# Patient Record
Sex: FEMALE | Race: WHITE | NOT HISPANIC OR LATINO | Employment: FULL TIME | URBAN - METROPOLITAN AREA
[De-identification: names, ages, dates, MRNs, and addresses within clinical notes are randomized per-mention and may not be internally consistent; named-entity substitution may affect disease eponyms.]

---

## 2017-01-20 ENCOUNTER — OFFICE VISIT (OUTPATIENT)
Dept: MEDICAL GROUP | Facility: MEDICAL CENTER | Age: 57
End: 2017-01-20
Payer: COMMERCIAL

## 2017-01-20 VITALS
HEART RATE: 74 BPM | SYSTOLIC BLOOD PRESSURE: 126 MMHG | OXYGEN SATURATION: 92 % | DIASTOLIC BLOOD PRESSURE: 74 MMHG | HEIGHT: 62 IN | WEIGHT: 257 LBS | BODY MASS INDEX: 47.29 KG/M2 | TEMPERATURE: 97.4 F | RESPIRATION RATE: 14 BRPM

## 2017-01-20 DIAGNOSIS — J02.9 PHARYNGITIS, UNSPECIFIED ETIOLOGY: ICD-10-CM

## 2017-01-20 PROCEDURE — 99213 OFFICE O/P EST LOW 20 MIN: CPT | Performed by: FAMILY MEDICINE

## 2017-01-20 RX ORDER — CLINDAMYCIN HYDROCHLORIDE 300 MG/1
300 CAPSULE ORAL 3 TIMES DAILY
Qty: 15 CAP | Refills: 0 | Status: SHIPPED | OUTPATIENT
Start: 2017-01-20 | End: 2017-01-25

## 2017-01-20 NOTE — MR AVS SNAPSHOT
"        Namrata Castellanos   2017 2:20 PM   Office Visit   MRN: [id number]    Department:  [address] [place] [address]   Dept Phone:  [phone]    Description:  Female : 1960   Provider:  Regina Navarro M.D.           Reason for Visit     Pharyngitis x 1 week    URI x 1 week      Allergies as of 2017     Allergen Noted Reactions    Ceclor [Cefaclor] 2014   Hives, Swelling    Ciprofloxacin 2014       Purpura    Erythromycin 2014   Hives, Swelling    Flagyl [Kdc:Metronidazole+Tartrazine] 2014   Hives, Swelling    Keflex 2015   Hives, Shortness of Breath    Macrodantin [Kdc:Red Dye+Yellow Dye+Ci Pigment Blue 63+Nitrofurantoin] 2014   Hives, Swelling    Tongue swelling    Pcn [Penicillins] 2014   Hives, Swelling    Septra [Bactrim Ds] 2014   Swelling    Tetracycline 2014   Hives, Swelling    Aspirin 2014   Vomiting    Nsaids 2014   Vomiting    GI bleeding    Tape 2014   Atopic Dermatitis    Corn Dextrin [Cvs Easy Fiber] 2014       Fish 2014       GI upset/bleeding    Other Food 2016   Diarrhea    Gluten intolerance:  Lower GI bleeding, pain, swelling    Wheat Bran 2014         Vital Signs     Blood Pressure Pulse Temperature Respirations Height Weight    126/74 mmHg 74 36.3 °C (97.4 °F) 14 1.575 m (5' 2.01\") 116.574 kg (257 lb)    Body Mass Index Oxygen Saturation Smoking Status             46.99 kg/m2 92% Never Smoker          Basic Information     Date Of Birth Sex Race Ethnicity Preferred Language    1960 Female White Non- English      Your appointments     Mar 14, 2017  3:00 PM   Follow Up Visit with Chaparrita Moss M.D.   [facility] (--)    [address], [address]  [facility] [address] [phone]           You will be receiving a confirmation call a few days before your appointment from our automated call confirmation system.              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   9/27/2016 - Present    BMI 40.0-44.9, adult (CMS-HCC) Z68.41   9/27/2016 - Present    Uncomplicated severe persistent asthma J45.50   9/27/2016 - Present      Health Maintenance        Date Due Completion Dates    IMM DTaP/Tdap/Td Vaccine (1 - Tdap) 7/27/1978 ---    IMM PNEUMOCOCCAL 19-64 (ADULT) MEDIUM RISK SERIES (1 of 1 - PPSV23) 7/27/1978 ---    COLONOSCOPY 7/27/2009 ---    MAMMOGRAM 12/1/2015 12/1/2014, 11/24/2014, 10/4/2012 (Prv Comp)    Override on 10/4/2012: Previously completed    IMM INFLUENZA (1) 9/1/2016 10/5/2015, 10/10/2014            Current Immunizations     Influenza Vaccine Quad Inj (Pf) 10/5/2015  9:13 AM, 10/10/2014    Tuberculin Skin Test 8/7/2014  8:42 AM      Below and/or attached are the medications your provider expects you to take. Review all of your home medications and newly ordered medications with your provider and/or pharmacist. Follow medication instructions as directed by your provider and/or pharmacist. Please keep your medication list with you and share with your provider. Update the information when medications are discontinued, doses are changed, or new medications (including over-the-counter products) are added; and carry medication information at all times in the event of emergency situations     Allergies:  CECLOR - Hives,Swelling     CIPROFLOXACIN - (reactions not documented)     ERYTHROMYCIN - Hives,Swelling     FLAGYL - Hives,Swelling     KEFLEX - Hives,Shortness of Breath     MACRODANTIN - Hives,Swelling     PCN - Hives,Swelling     SEPTRA - Swelling     TETRACYCLINE - Hives,Swelling     ASPIRIN - Vomiting     NSAIDS - Vomiting     TAPE - Atopic Dermatitis     CORN DEXTRIN - (reactions not documented)     FISH - (reactions not documented)      OTHER FOOD - Diarrhea     WHEAT BRAN - (reactions not documented)               Medications  Valid as of: January 20, 2017 -  2:41 PM    Generic Name Brand Name Tablet Size Instructions for use    Albuterol Sulfate (Aero Soln) albuterol 108 (90 BASE) MCG/ACT Inhale 2 Puffs by mouth every 6 hours as needed.        Clindamycin HCl (Cap) CLEOCIN 300 MG Take 1 Cap by mouth 3 times a day for 5 days.        Fluticasone Propionate (Suspension) FLONASE 50 MCG/ACT Spray 1 Spray in nose every day.        Fluticasone-Salmeterol (AEROSOL POWDER, BREATH ACTIVATED) ADVAIR 500-50 MCG/DOSE Inhale 1 Puff by mouth every 12 hours.        .                 Medicines prescribed today were sent to:     [facility] [address] [place], [place] - [address]    [address] [facility] [address]    Phone: [phone] Fax: [phone]    Open 24 Hours?: No    [id number] [place], [place] - [address]    [address] [facility] [address]    Phone: [phone] Fax: [phone]    Open 24 Hours?: No      Medication refill instructions:       If your prescription bottle indicates you have medication refills left, it is not necessary to call your provider’s office. Please contact your pharmacy and they will refill your medication.    If your prescription bottle indicates you do not have any refills left, you may request refills at any time through one of the following ways: The online Comeks system (except Urgent Care), by calling your provider’s office, or by asking your pharmacy to contact your provider’s office with a refill request. Medication refills are processed only during regular business hours and may not be available until the next business day. Your provider may request additional information or to have a follow-up visit with you prior to refilling your medication.   *Please Note: Medication refills are assigned a new Rx number when refilled electronically. Your pharmacy may  indicate that no refills were authorized even though a new prescription for the same medication is available at the pharmacy. Please request the medicine by name with the pharmacy before contacting your provider for a refill.           [employer] Mobile Appt Access Code: Activation code not generated  Current ImmunotEGG Status: Active

## 2017-01-21 NOTE — PROGRESS NOTES
cc:  Sore throat    Subjective:     Namrata Castellanos is a 56 y.o. female presenting with a worsening sore throat.  Started about 4-5 days ago.  Associated with fevers for 3 nights, nausea, diarrhea, decreased appetite, nasal congestion, headaches, cough, sneezing, some shortness of breath.  Has been taking elderberry which has been helpful, nasal saline rinse.  Denies any nausea/vomiting, chest pain, abdoinal pain, blood in urine/stools, rash, sick contacts, recent travel.  No flu shot this season.  Is traveling to [place] on Monday, is worried symptoms will continue to get worse.      Review of systems:  See above.          Current outpatient prescriptions:   •  clindamycin (CLEOCIN) 300 MG Cap, Take 1 Cap by mouth 3 times a day for 5 days., Disp: 15 Cap, Rfl: 0  •  albuterol (VENTOLIN OR PROVENTIL) 108 (90 BASE) MCG/ACT AERS inhalation aerosol, Inhale 2 Puffs by mouth every 6 hours as needed., Disp: 8.5 g, Rfl: 3  •  fluticasone (FLONASE) 50 MCG/ACT nasal spray, Spray 1 Spray in nose every day., Disp: , Rfl:   •  fluticasone-salmeterol (ADVAIR) 500-50 MCG/DOSE AEPB, Inhale 1 Puff by mouth every 12 hours., Disp: , Rfl:     Allergies   Allergen Reactions   • Ceclor [Cefaclor] Hives and Swelling   • Ciprofloxacin      Purpura   • Erythromycin Hives and Swelling   • Flagyl [Kdc:Metronidazole+Tartrazine] Hives and Swelling   • Keflex Hives and Shortness of Breath   • Macrodantin [Kdc:Red Dye+Yellow Dye+Ci Pigment Blue 63+Nitrofurantoin] Hives and Swelling     Tongue swelling   • Pcn [Penicillins] Hives and Swelling   • Septra [Bactrim Ds] Swelling   • Tetracycline Hives and Swelling   • Aspirin Vomiting   • Nsaids Vomiting     GI bleeding   • Tape Atopic Dermatitis   • Corn Dextrin [Cvs Easy Fiber]    • Fish      GI upset/bleeding   • Other Food Diarrhea     Gluten intolerance:  Lower GI bleeding, pain, swelling   • Wheat Bran        Past Medical History   Diagnosis Date   • Allergy, unspecified not elsewhere  classified    • Asthma    • H/o Lyme disease 1990     tertiary, treated with IV abx   • Arthritis    • Gluten intolerance    • Positive JOHNIE (antinuclear antibody)    • 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   • Hiatus hernia syndrome    • Bowel habit changes      constipation or diarrhea diet related   • Dental disorder      bilateral upper broken molar   • Pain      joints (arthritis); left hip, feet, ankles, hands     Past Surgical History   Procedure Laterality Date   • Rotator cuff repair Right 2012     Right   • Abdominal hysterectomy total  2005     BSO-cysts, heavy bleeding   • Lumpectomy Right 2001     papillary adenoma right breast   • Primary c section  1990   • Appendectomy  1998   • Knee arthroscopy Left 2015   • Shoulder arthroscopy Left 2015   • Laparoscopy  1994   • Ethmoidectomy Left 1993   • Antrostomy Bilateral 3/11/2016     Procedure: ENDOSCOPIC MAXILLARY ANTROSTOMIES;  Surgeon: Cat Carver M.D.;  Location: [facility];  Service:    • Ethmoidectomy Bilateral 3/11/2016     Procedure: ENDOSCOPIC ETHMOIDECTOMIES;  Surgeon: Cat Carver M.D.;  Location: [facility];  Service:    • Turbinoplasty Bilateral 3/11/2016     Procedure: TURBINOPLASTY;  Surgeon: Cat Carver M.D.;  Location: SURGERY [facility];  Service:      Family History   Problem Relation Age of Onset   • Other Mother      macular degeneration   • Cancer Mother      breast   • Cancer Father      colon   • GI Brother      celiac   • Other Brother      psoriasis   • Cancer Paternal Uncle      colon   • GI Paternal Grandmother      UC   • Diabetes     • Heart Disease     • Hypertension       Social History     Social History   • Marital Status:      Spouse Name: N/A   • Number of Children: N/A   • Years of Education: N/A     Occupational History   • Not on file.     Social History Main Topics   • Smoking status: Never Smoker    • Smokeless tobacco: Never Used   • Alcohol  "Use: 0.0 oz/week     0 Standard drinks or equivalent per week      Comment: 2 per week   • Drug Use: No   • Sexual Activity:     Partners: Male     Other Topics Concern   • Not on file     Social History Narrative       Objective:     Vitals: /74 mmHg  Pulse 74  Temp(Src) 36.3 °C (97.4 °F)  Resp 14  Ht 1.575 m (5' 2.01\")  Wt 116.574 kg (257 lb)  BMI 46.99 kg/m2  SpO2 92%  General: Alert, pleasant, NAD  HEENT: Normocephalic.  PERRL, EOMI, no icterus or pallor.  Conjunctivae and lids normal. External ears normal. Tympanic membranes pearly, opaque.  Nares patent, mucosa pink.  Posterior pharynx slightly erythematous, no exudates, mucous membranes moist.  Neck supple.  No thyromegaly or masses palpated. No cervical or supraclavicular lymphadenopathy.  Heart: Regular rate and rhythm.  S1 and S2 normal.  No murmurs appreciated.  Respiratory: Normal respiratory effort.  Clear to auscultation bilaterally.    Abdomen: Non-distended, soft  Skin: Warm, dry.  Erythematous patch on forehead, nose, cheeks, chin with scattered erythematous papules.  Extremities: No leg edema.   Psych:  Affect/mood is normal, judgement is good, memory is intact, grooming is appropriate.    Assessment/Plan:     Namrata was seen today for pharyngitis and uri.    Diagnoses and all orders for this visit:    Pharyngitis, unspecified etiology  Discussed likely viral URI, recommended conservative management with rest, fluids, otc meds, hot water and honey, nasal saline rinses.   As she will be traveling out of state in a couple days, prescription for clindamycin printed and given to patient in case symptoms worsen.  Pt agrees to not take it unless symptoms persist for another 6 days or so, she doesn't like to take medications unless needed since she has multiple antibiotic allergies and environmental allergies as well.  She even appears to have an allergic rash on her face, pt plans to start taking zyrtec for the itch.  -     clindamycin " (CLEOCIN) 300 MG Cap; Take 1 Cap by mouth 3 times a day for 5 days.        Return if symptoms worsen or fail to improve.

## 2017-03-09 ENCOUNTER — OFFICE VISIT (OUTPATIENT)
Dept: MEDICAL GROUP | Facility: MEDICAL CENTER | Age: 57
End: 2017-03-09
Payer: COMMERCIAL

## 2017-03-09 VITALS
DIASTOLIC BLOOD PRESSURE: 82 MMHG | OXYGEN SATURATION: 96 % | HEIGHT: 62 IN | BODY MASS INDEX: 48.58 KG/M2 | WEIGHT: 264 LBS | RESPIRATION RATE: 16 BRPM | SYSTOLIC BLOOD PRESSURE: 124 MMHG | HEART RATE: 78 BPM | TEMPERATURE: 98.6 F

## 2017-03-09 DIAGNOSIS — J45.50 UNCOMPLICATED SEVERE PERSISTENT ASTHMA: ICD-10-CM

## 2017-03-09 DIAGNOSIS — Z23 NEED FOR VACCINATION: ICD-10-CM

## 2017-03-09 DIAGNOSIS — E66.01 MORBID OBESITY WITH BMI OF 45.0-49.9, ADULT (HCC): ICD-10-CM

## 2017-03-09 PROCEDURE — 90732 PPSV23 VACC 2 YRS+ SUBQ/IM: CPT | Performed by: FAMILY MEDICINE

## 2017-03-09 PROCEDURE — 90471 IMMUNIZATION ADMIN: CPT | Performed by: FAMILY MEDICINE

## 2017-03-09 PROCEDURE — 99213 OFFICE O/P EST LOW 20 MIN: CPT | Mod: 25 | Performed by: FAMILY MEDICINE

## 2017-03-09 RX ORDER — MONTELUKAST SODIUM 10 MG/1
10 TABLET ORAL DAILY
Qty: 90 TAB | Refills: 0 | Status: SHIPPED | OUTPATIENT
Start: 2017-03-09 | End: 2017-07-13 | Stop reason: SDUPTHER

## 2017-03-09 RX ORDER — ALBUTEROL SULFATE 90 UG/1
2 AEROSOL, METERED RESPIRATORY (INHALATION) EVERY 6 HOURS PRN
Qty: 8.5 G | Refills: 3 | Status: SHIPPED | OUTPATIENT
Start: 2017-03-09

## 2017-03-10 NOTE — PROGRESS NOTES
cc:  asthma    Subjective:     Namrata Castellanos is a 56 y.o. female presenting for a follow up of asthma.  She ran out of her advair about a month ago, and has been feeling short of breath at times, relieved with albuterol, also has a dry intermittent cough.  Has been using albuterol 5 times a day.  Continues to use flonase.  Has allergies, tried zyrtec but it made her drowsy, claritin was not that effective, allegra hasn't been tried yet.  Does have some left sided chest pain when she takes a deep breath, but she thinks it is from the coughing.      Review of systems:  See above.          Current outpatient prescriptions:   •  fluticasone-salmeterol (ADVAIR) 500-50 MCG/DOSE AEROSOL POWDER, BREATH ACTIVATED, Inhale 1 Puff by mouth every 12 hours., Disp: 3 Inhaler, Rfl: 3  •  albuterol 108 (90 BASE) MCG/ACT Aero Soln inhalation aerosol, Inhale 2 Puffs by mouth every 6 hours as needed for Shortness of Breath., Disp: 8.5 g, Rfl: 3  •  montelukast (SINGULAIR) 10 MG Tab, Take 1 Tab by mouth every day., Disp: 90 Tab, Rfl: 0  •  fluticasone (FLONASE) 50 MCG/ACT nasal spray, Spray 1 Spray in nose every day., Disp: , Rfl:     Allergies   Allergen Reactions   • Ceclor [Cefaclor] Hives and Swelling   • Ciprofloxacin      Purpura   • Erythromycin Hives and Swelling   • Flagyl [Kdc:Metronidazole+Tartrazine] Hives and Swelling   • Keflex Hives and Shortness of Breath   • Pcn [Penicillins] Hives and Swelling   • Septra [Bactrim Ds] Swelling   • Tetracycline Hives and Swelling   • Aspirin Vomiting   • Nsaids Vomiting     GI bleeding   • Tape Atopic Dermatitis   • Corn Dextrin [Cvs Easy Fiber]    • Fish      GI upset/bleeding   • Nitrofurantoin Hives and Swelling     Tongue swelling   • Other Food Diarrhea     Gluten intolerance:  Lower GI bleeding, pain, swelling   • Wheat Bran        Past Medical History   Diagnosis Date   • Allergy, unspecified not elsewhere classified    • Asthma    • H/o Lyme disease 1990     tertiary,  treated with IV abx   • Arthritis    • Gluten intolerance    • Positive JOHNIE (antinuclear antibody)    • 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   • Hiatus hernia syndrome    • Bowel habit changes      constipation or diarrhea diet related   • Dental disorder      bilateral upper broken molar   • Pain      joints (arthritis); left hip, feet, ankles, hands     Past Surgical History   Procedure Laterality Date   • Rotator cuff repair Right 2012     Right   • Abdominal hysterectomy total  2005     BSO-cysts, heavy bleeding   • Lumpectomy Right 2001     papillary adenoma right breast   • Primary c section  1990   • Appendectomy  1998   • Knee arthroscopy Left 2015   • Shoulder arthroscopy Left 2015   • Laparoscopy  1994   • Ethmoidectomy Left 1993   • Antrostomy Bilateral 3/11/2016     Procedure: ENDOSCOPIC MAXILLARY ANTROSTOMIES;  Surgeon: Cat Carver M.D.;  Location: SUR[facility]ERY [facility];  Service:    • Ethmoidectomy Bilateral 3/11/2016     Procedure: ENDOSCOPIC ETHMOIDECTOMIES;  Surgeon: Cat Carver M.D.;  Location: [facility];  Service:    • Turbinoplasty Bilateral 3/11/2016     Procedure: TURBINOPLASTY;  Surgeon: Cat Carver M.D.;  Location: SURGERY [facility];  Service:      Family History   Problem Relation Age of Onset   • Other Mother      macular degeneration   • Cancer Mother      breast   • Cancer Father      colon   • GI Brother      celiac   • Other Brother      psoriasis   • Cancer Paternal Uncle      colon   • GI Paternal Grandmother      UC   • Diabetes     • Heart Disease     • Hypertension       Social History     Social History   • Marital Status:      Spouse Name: N/A   • Number of Children: N/A   • Years of Education: N/A     Occupational History   • Not on file.     Social History Main Topics   • Smoking status: Never Smoker    • Smokeless tobacco: Never Used   • Alcohol Use: 0.0 oz/week     0 Standard drinks or equivalent per week       "Comment: 2 per week   • Drug Use: No   • Sexual Activity:     Partners: Male     Other Topics Concern   • Not on file     Social History Narrative       Objective:     Vitals: /82 mmHg  Pulse 78  Temp(Src) 37 °C (98.6 °F)  Resp 16  Ht 1.575 m (5' 2.01\")  Wt 119.75 kg (264 lb)  BMI 48.27 kg/m2  SpO2 96%  General: Alert, pleasant, NAD  HEENT: Normocephalic.    Heart: Regular rate and rhythm.  S1 and S2 normal.  No murmurs appreciated.  Respiratory: Normal respiratory effort.  Clear to auscultation bilaterally.  Good air movement throughout. No wheezing  Abdomen: Non-distended, soft  Extremities: No leg edema.   Psych:  Affect/mood is normal, judgement is good, memory is intact, grooming is appropriate.    Assessment/Plan:     Namrata was seen today for asthma.    Diagnoses and all orders for this visit:    Uncomplicated severe persistent asthma  Will resume the advair daily, albuterol prn, flonase daily, also recommended trying allegra or claritin.  Will also see if singulair is covered if allegra and claritin are ineffective.  -     fluticasone-salmeterol (ADVAIR) 500-50 MCG/DOSE AEROSOL POWDER, BREATH ACTIVATED; Inhale 1 Puff by mouth every 12 hours.  -     albuterol 108 (90 BASE) MCG/ACT Aero Soln inhalation aerosol; Inhale 2 Puffs by mouth every 6 hours as needed for Shortness of Breath.  -     montelukast (SINGULAIR) 10 MG Tab; Take 1 Tab by mouth every day.    Need for vaccination  -     PNEUMOCOCCAL POLYSACCHARIDE VACCINE 23-VALENT =>1YO SQ/IM    Morbid obesity with BMI of 45.0-49.9, adult (CMS-[facility])  -     Patient identified as having weight management issue.  Appropriate orders and counseling given.      Return in about 6 months (around 9/9/2017).  "

## 2017-03-14 ENCOUNTER — OFFICE VISIT (OUTPATIENT)
Dept: RHEUMATOLOGY | Facility: PHYSICIAN GROUP | Age: 57
End: 2017-03-14
Payer: COMMERCIAL

## 2017-03-14 VITALS
DIASTOLIC BLOOD PRESSURE: 70 MMHG | RESPIRATION RATE: 16 BRPM | TEMPERATURE: 99.1 F | HEART RATE: 92 BPM | BODY MASS INDEX: 47.91 KG/M2 | OXYGEN SATURATION: 95 % | WEIGHT: 262 LBS | SYSTOLIC BLOOD PRESSURE: 122 MMHG

## 2017-03-14 DIAGNOSIS — K90.0 CD (CELIAC DISEASE): ICD-10-CM

## 2017-03-14 DIAGNOSIS — M15.9 PRIMARY OSTEOARTHRITIS INVOLVING MULTIPLE JOINTS: ICD-10-CM

## 2017-03-14 DIAGNOSIS — E66.01 MORBID OBESITY WITH BMI OF 45.0-49.9, ADULT (HCC): ICD-10-CM

## 2017-03-14 DIAGNOSIS — R76.0 LUPUS ANTICOAGULANT POSITIVE: ICD-10-CM

## 2017-03-14 PROCEDURE — 99214 OFFICE O/P EST MOD 30 MIN: CPT | Performed by: INTERNAL MEDICINE

## 2017-03-14 RX ORDER — MELOXICAM 15 MG/1
TABLET ORAL
Qty: 30 TAB | Refills: 2 | Status: SHIPPED | OUTPATIENT
Start: 2017-03-14

## 2017-03-14 NOTE — MR AVS SNAPSHOT
Namrata Castellanos   3/14/2017 3:00 PM   Office Visit   MRN: [id number]    Department:  Rheumatology [facility]   Dept Phone:  [phone]    Description:  Female : 1960   Provider:  Chaparrita Moss M.D.           Reason for Visit     Follow-Up           Allergies as of 3/14/2017     Allergen Noted Reactions    Ceclor [Cefaclor] 2014   Hives, Swelling    Ciprofloxacin 2014       Purpura    Erythromycin 2014   Hives, Swelling    Flagyl [Kdc:Metronidazole+Tartrazine] 2014   Hives, Swelling    Keflex 2015   Hives, Shortness of Breath    Pcn [Penicillins] 2014   Hives, Swelling    Septra [Bactrim Ds] 2014   Swelling    Tetracycline 2014   Hives, Swelling    Aspirin 2014   Vomiting    Nsaids 2014   Vomiting    GI bleeding    Tape 2014   Atopic Dermatitis    Corn Dextrin [Cvs Easy Fiber] 2014       Fish 2014       GI upset/bleeding    Nitrofurantoin 2017   Hives, Swelling    Tongue swelling    Other Food 2016   Diarrhea    Gluten intolerance:  Lower GI bleeding, pain, swelling    Wheat Bran 2014         You were diagnosed with     Primary osteoarthritis involving multiple joints   [1428408]       CD (celiac disease)   [436192]         Vital Signs     Blood Pressure Pulse Temperature Respirations Weight Oxygen Saturation    122/70 mmHg 92 37.3 °C (99.1 °F) 16 118.842 kg (262 lb) 95%    Smoking Status                   Never Smoker            Basic Information     Date Of Birth Sex Race Ethnicity Preferred Language    1960 Female White Non- English      Your appointments     2017  2:00 PM   Follow Up Visit with Chaparrita Moss M.D.   [facility] (--)    [address], [address]  [place] [address] [phone]           You will be receiving a confirmation call a few days before your appointment from our automated call confirmation system.              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   9/27/2016 - Present    Uncomplicated severe persistent asthma J45.50   9/27/2016 - Present    Morbid obesity with BMI of 45.0-49.9, adult (HCC) E66.01, Z68.42   3/9/2017 - Present    Primary osteoarthritis involving multiple joints M15.0   3/14/2017 - Present      Health Maintenance        Date Due Completion Dates    IMM DTaP/Tdap/Td Vaccine (1 - Tdap) 7/27/1978 ---    COLONOSCOPY 7/27/2009 ---    MAMMOGRAM 12/1/2015 12/1/2014, 11/24/2014, 10/4/2012 (Prv Comp)    Override on 10/4/2012: Previously completed    IMM INFLUENZA (1) 9/1/2016 10/5/2015, 10/10/2014            Current Immunizations     Influenza Vaccine Quad Inj (Pf) 10/5/2015  9:13 AM, 10/10/2014    Pneumococcal polysaccharide vaccine (PPSV-23) 3/9/2017    Tuberculin Skin Test 8/7/2014  8:42 AM      Below and/or attached are the medications your provider expects you to take. Review all of your home medications and newly ordered medications with your provider and/or pharmacist. Follow medication instructions as directed by your provider and/or pharmacist. Please keep your medication list with you and share with your provider. Update the information when medications are discontinued, doses are changed, or new medications (including over-the-counter products) are added; and carry medication information at all times in the event of emergency situations     Allergies:  CECLOR - Hives,Swelling     CIPROFLOXACIN - (reactions not documented)     ERYTHROMYCIN - Hives,Swelling     FLAGYL - Hives,Swelling     KEFLEX - Hives,Shortness of Breath     PCN - Hives,Swelling     SEPTRA - Swelling     TETRACYCLINE - Hives,Swelling     ASPIRIN - Vomiting     NSAIDS - Vomiting     TAPE - Atopic Dermatitis     CORN DEXTRIN - (reactions not documented)     FISH - (reactions not documented)      NITROFURANTOIN - Hives,Swelling     OTHER FOOD - Diarrhea     WHEAT BRAN - (reactions not documented)               Medications  Valid as of: March 14, 2017 -  3:44 PM    Generic Name Brand Name Tablet Size Instructions for use    Albuterol Sulfate (Aero Soln) albuterol 108 (90 BASE) MCG/ACT Inhale 2 Puffs by mouth every 6 hours as needed for Shortness of Breath.        Fluticasone Propionate (Suspension) FLONASE 50 MCG/ACT Spray 1 Spray in nose every day.        Fluticasone-Salmeterol (AEROSOL POWDER, BREATH ACTIVATED) ADVAIR 500-50 MCG/DOSE Inhale 1 Puff by mouth every 12 hours.        Meloxicam (Tab) MOBIC 15 MG 1 tab po qday for joint pain        Montelukast Sodium (Tab) SINGULAIR 10 MG Take 1 Tab by mouth every day.        .                 Medicines prescribed today were sent to:     [facility] [address] [place] [address], [place] - [address] [facility] [address]    [address] [facility] [address]    Phone: [phone] Fax: [phone]    Open 24 Hours?: No    [id number] [place], [place] - [address] [facility] [address]    [address] [facility] [address] [place] [address]    Phone: [phone] Fax: [phone]    Open 24 Hours?: No      Medication refill instructions:       If your prescription bottle indicates you have medication refills left, it is not necessary to call your provider’s office. Please contact your pharmacy and they will refill your medication.    If your prescription bottle indicates you do not have any refills left, you may request refills at any time through one of the following ways: The online WalkMe system (except Urgent Care), by calling your provider’s office, or by asking your pharmacy to contact your provider’s office with a refill request. Medication refills are processed only during regular business hours and may not be available until the next business day. Your provider may request additional information or to have a follow-up visit with you prior to refilling your medication.      *Please Note: Medication refills are assigned a new Rx number when refilled electronically. Your pharmacy may indicate that no refills were authorized even though a new prescription for the same medication is available at the pharmacy. Please request the medicine by name with the pharmacy before contacting your provider for a refill.           [employer] Access Code: Activation code not generated  Current [employer] Status: Active

## 2017-03-14 NOTE — Clinical Note
[facility]   [address], [address]  [place] [address]  Phone: [phone]  Fax: [phone]              Encounter Date: 3/14/2017    Dear Dr. Valencia ref. provider found,    It was a pleasure seeing your patient, Namrata Castellanos, on 3/14/2017. Diagnoses of Primary osteoarthritis involving multiple joints, Lupus anticoagulant positive, CD (celiac disease), and Morbid obesity with BMI of 45.0-49.9, adult (HCC) were pertinent to this visit.     Please find attached progress note which includes the history I obtained from Ms. Castellanos, my physical examination findings, my impression and recommendations.      Once again, it was a pleasure participating in your patient's care.  Please feel free to contact me if you have any questions or if I can be of any further assistance to your patients.      Sincerely,    Chaparrita Moss M.D.  Electronically Signed          PROGRESS NOTE:  No notes on file

## 2017-03-15 NOTE — PROGRESS NOTES
Chief Complaint- joint pain    Subjective:   Namrata Castellanos is a 56 y.o. female here today for follow up of rheumatological issues    This is a follow-up patient to this clinic, new patient to me, previously followed by Dr Godinez for polyarthralgias, patient with a past history of Lyme disease 1991 treated with antibiotics i.e. Rocephin for 3 weeks, patient queries whether her symptoms may be secondary to lingering Lyme disease, at last visit we did a Lyme test which was negative patient also has a remote history of lupus and Sjogren syndrome status post evaluation by [facility] rheumatology clinic who told patient that lupus is ruled out however patient still wonders what's causing her polyarthralgias. Patient also has a persistent rash on her face, status post multiple dermatology evaluations in [place] with diagnoses ranging from eczema, psoriasis, rosacea, patient recently evaluated by her primary care doctor who thought this might be an allergy, patient's changed her shampoo and the rash on her face has resolved. Patient denies any heart liver lung or kidney disease, denies any blood clots, did have a rotator cuff tear repair surgically. At last visit we found patient had DJD in bilateral hip I hip x-rays, status post cortisone injection left hip with good results.    Lyme neg 11/2016  JOHNIE neg 12/2014, JOHNIE neg 5/2015  apl neg 5/2015  Uric acid 4.4 12/2014  CPK 73 12/2014  CCP neg 12/2014  RF neg 12/2014  DRVVT pos 5/2015  Hand x-rays 12/2014-negative  Feet x-rays 12/2014 -spurring/DJD          Current medicines (including changes today)  Current Outpatient Prescriptions   Medication Sig Dispense Refill   • meloxicam (MOBIC) 15 MG tablet 1 tab po qday for joint pain 30 Tab 2   • fluticasone-salmeterol (ADVAIR) 500-50 MCG/DOSE AEROSOL POWDER, BREATH ACTIVATED Inhale 1 Puff by mouth every 12 hours. 3 Inhaler 3   • montelukast (SINGULAIR) 10 MG Tab Take 1 Tab by mouth every day. 90 Tab 0   •  fluticasone (FLONASE) 50 MCG/ACT nasal spray Spray 1 Spray in nose every day.     • albuterol 108 (90 BASE) MCG/ACT Aero Soln inhalation aerosol Inhale 2 Puffs by mouth every 6 hours as needed for Shortness of Breath. 8.5 g 3     No current facility-administered medications for this visit.     She  has a past medical history of Allergy, unspecified not elsewhere classified; Asthma; H/o Lyme disease (1990); Arthritis; Gluten intolerance; Positive JOHNIE (antinuclear antibody); Coronary artery abnormality (2012); Hiatus hernia syndrome; Bowel habit changes; Dental disorder; and Pain.    ROS   Other than what is mentioned in HPI or physical exam, there is no history of headaches, double vision or blurred vision. No temporal tenderness or jaw claudication. No history of cataracts or glaucoma. No trouble swallowing difficulties or sore throats. No history of thyroid disease. No chest complaints including chest pain, cough or sputum production. No shortness of breath. No GI complaints including nausea, vomiting, change in bowel habits, or past peptic ulcer disease. No history of blood in the stools. No urinary complaints including dysuria or frequency. No history of rash including psoriasis. No history of alopecia, photosensitivity, oral ulcerations, Raynaud's phenomena, or swollen joints. No history of gout. No back complaints. No history of low blood counts.       Objective:     Blood pressure 122/70, pulse 92, temperature 37.3 °C (99.1 °F), resp. rate 16, weight 118.842 kg (262 lb), SpO2 95 %. Body mass index is 47.91 kg/(m^2).   Physical Exam:  GENERAL: Quite Overweight.  Alert and oriented x 3, No apparent distress. SKIN: No inflammation, normal turgor, no rashes. HEENT: Atraumatic, unremarkable. PERRLA, extraocular movements are intact. Conjunctiva clear. Fundi not examined. Temporal arteries are palpable and non-tender. THROAT: Clear. NECK: Supple with good range of motion including flexion, extension, lateral  rotation and bending. No JVD, thyromegaly or adenopathy is appreciated. Carotids are palpable, no bruits. CHEST: Clear to ausculation and percussion bilaterally, no rales or rhonchi. Respiratory efforts good. BREASTS: Not examined. COR: Regular rate and rhythm. No murmurs, rubs or gallops. ABDOMEN: Soft, non-tender, non-distended. Normal active bowel sounds. No organomegaly appreciated. No hepatomegaly. EXTREMITIES: No clubbing, cyanosis, edema. MUSCULOSKELETAL: Both shoulders have full range of motion including internal and external rotation and abduction. Both elbows have full range of motion without active active synovitis. The wrists have good range of motion without limitations. The small joints of the hands are unremarkable without significant swelling or tenderness. There are no tophi or nodules appreciated. The hips, knees, ankles and feet all have good range of motion.  Low back is normal. There is no SI tenderness to compression or palpation. There is good lumbar flexion. RECTAL: Not done. : Not done. NEUROLOGICAL: Grossly intact. Motor and sensory examinations are normal. PULSES. Intact    Lab Results   Component Value Date/Time    SODIUM 136 10/14/2015 03:55 PM    POTASSIUM 4.0 10/14/2015 03:55 PM    CHLORIDE 103 10/14/2015 03:55 PM    CO2 25 10/14/2015 03:55 PM    GLUCOSE 100* 10/14/2015 03:55 PM    BUN 15 10/14/2015 03:55 PM    CREATININE 0.72 10/14/2015 03:55 PM      Lab Results   Component Value Date/Time    WBC 8.7 12/18/2014 09:37 AM    RBC 5.02 12/18/2014 09:37 AM    HEMOGLOBIN 14.8 12/18/2014 09:37 AM    HEMATOCRIT 44.4 10/14/2015 03:55 PM    MCV 91.2 12/18/2014 09:37 AM    MCH 29.5 12/18/2014 09:37 AM    MCHC 32.3* 12/18/2014 09:37 AM    MPV 9.4 12/18/2014 09:37 AM    NEUTROPHILS-POLYS 72.7* 12/18/2014 09:37 AM    LYMPHOCYTES 17.8* 12/18/2014 09:37 AM    MONOCYTES 7.2 12/18/2014 09:37 AM    EOSINOPHILS 1.3 12/18/2014 09:37 AM    BASOPHILS 0.7 12/18/2014 09:37 AM      Lab Results   Component  Value Date/Time    CALCIUM 9.3 10/14/2015 03:55 PM    AST(SGOT) 15 12/18/2014 09:37 AM    ALT(SGPT) 19 12/18/2014 09:37 AM    ALKALINE PHOSPHATASE 94 12/18/2014 09:37 AM    TOTAL BILIRUBIN 0.5 12/18/2014 09:37 AM    ALBUMIN 4.7 12/18/2014 09:37 AM    TOTAL PROTEIN 7.9 12/18/2014 09:37 AM     Lab Results   Component Value Date/Time    URIC ACID 4.4 12/18/2014 09:37 AM    RHEUMATOID FACTOR -NEPH- 12 12/18/2014 09:37 AM    CCP ANTIBODIES 4 12/18/2014 09:37 AM    ANTINUCLEAR ANTIBODY None Detected 05/30/2015 01:08 PM     Lab Results   Component Value Date/Time    DILUTE PENELOPE VIPER VENOM SUNNI 51.9* 05/30/2015 01:08 PM    DRVVT INTERPRETATION DRVRI see below 05/30/2015 01:08 PM     Lab Results   Component Value Date/Time    ANTI-CARIOLIPIN AB IGG 7 05/30/2015 01:08 PM    ANTI-CARDIOLIPIN AB IGM 12 05/30/2015 01:08 PM    ANTI-CARDIOLIPIN AB IGA 4 05/30/2015 01:08 PM     Lab Results   Component Value Date/Time    COLOR Yellow 05/30/2015 01:08 PM    SPECIFIC GRAVITY 1.023 05/30/2015 01:08 PM    PH 5.5 05/30/2015 01:08 PM    GLUCOSE Negative 05/30/2015 01:08 PM    KETONES 20* 05/30/2015 01:08 PM    PROTEIN Negative 05/30/2015 01:08 PM     Lab Results   Component Value Date/Time    SED RATE WESTERGREN 9 12/18/2014 09:37 AM     Lab Results   Component Value Date/Time    CPK TOTAL 73 12/18/2014 09:37 AM     Results for orders placed during the hospital encounter of 12/18/14   DX-JOINT SURVEY-HANDS SINGLE VIEW    Impression No significant bony arthritic change is appreciated.     Results for orders placed during the hospital encounter of 12/18/14   DX-JOINT SURVEY-FEET SINGLE VIEW    Impression Minimal spurring and cystic changes.     Results for orders placed during the hospital encounter of 12/18/14   DX-KNEES-AP BILATERAL STANDING    Impression Bilateral tibial spine spurring.     Results for orders placed during the hospital encounter of 12/18/14   DX-PELVIS-1 OR 2 VIEWS    Impression Degenerative changes about both hip  joints, left greater than right.     Results for orders placed during the hospital encounter of 12/18/14   DX-KNEES-AP BILATERAL STANDING    Impression Bilateral tibial spine spurring.     Results for orders placed during the hospital encounter of 12/18/14   DX-PELVIS-1 OR 2 VIEWS    Impression Degenerative changes about both hip joints, left greater than right.     Results for orders placed during the hospital encounter of 12/18/14   DX-SHOULDER 2+    Impression No radiographic evidence of acute traumatic injury. Minimal degenerative spurring.     Results for orders placed during the hospital encounter of 01/31/15   MR-KNEE-W/O    Impression 1.  Thinning of the articular cartilage, most severe in the patellofemoral compartment.    2.   linear, horizontally- oriented tear of the posterior body of the medial meniscus extending to the inferior articular surface.      Assessment and Plan:     1. Primary osteoarthritis involving multiple joints  As seen on x-rays, will do trial meloxicam 15 mg by mouth daily when necessary to be taken with food, will check CBC, comprehensive panel every 6 months.  - meloxicam (MOBIC) 15 MG tablet; 1 tab po qday for joint pain  Dispense: 30 Tab; Refill: 2    2. Lupus anticoagulant positive  Has never had a blood clot, no DVT, no PE, recommend baby aspirin by mouth daily    3. CD (celiac disease)  Patient avoids wheat, apparently a strong family history of celiac disease in the family  - meloxicam (MOBIC) 15 MG tablet; 1 tab po qday for joint pain  Dispense: 30 Tab; Refill: 2    4. Morbid obesity with BMI of 45.0-49.9, adult (HCC)  Exacerbates the patient's arthritis, recommend to lose weight, patient states understanding    Followup: Return in about 6 months (around 9/14/2017). 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    Patient was seen 30 minutes face-to-face of which more than 50% of the time was spent  counseling the patient (excluding time for procedures)  regarding  rheumatological condition and care. Therapy was discussed in detail.    Please note that this dictation was created using voice recognition software. I have made every reasonable attempt to correct obvious errors, but I expect that there are errors of grammar and possibly content that I did not discover before finalizing the note.

## 2017-04-12 DIAGNOSIS — M15.9 PRIMARY OSTEOARTHRITIS INVOLVING MULTIPLE JOINTS: ICD-10-CM

## 2017-04-21 DIAGNOSIS — J45.50 UNCOMPLICATED SEVERE PERSISTENT ASTHMA: ICD-10-CM

## 2017-07-13 RX ORDER — MONTELUKAST SODIUM 10 MG/1
10 TABLET ORAL DAILY
Qty: 90 TAB | Refills: 3 | Status: SHIPPED | OUTPATIENT
Start: 2017-07-13

## 2017-11-09 ENCOUNTER — TELEPHONE (OUTPATIENT)
Dept: PULMONOLOGY | Facility: HOSPICE | Age: 57
End: 2017-11-09

## 2017-11-09 DIAGNOSIS — R06.00 DYSPNEA, UNSPECIFIED TYPE: ICD-10-CM

## 2017-11-16 ENCOUNTER — APPOINTMENT (OUTPATIENT)
Dept: PULMONOLOGY | Facility: HOSPICE | Age: 57
End: 2017-11-16
Payer: COMMERCIAL

## 2017-11-16 ENCOUNTER — APPOINTMENT (OUTPATIENT)
Dept: RHEUMATOLOGY | Facility: PHYSICIAN GROUP | Age: 57
End: 2017-11-16
Payer: COMMERCIAL

## 2021-03-15 DIAGNOSIS — Z23 NEED FOR VACCINATION: ICD-10-CM

## 2023-09-13 ENCOUNTER — DOCUMENTATION (OUTPATIENT)
Dept: HEALTH INFORMATION MANAGEMENT | Facility: OTHER | Age: 63
End: 2023-09-13
Payer: COMMERCIAL

## 2023-11-21 ENCOUNTER — TELEPHONE (OUTPATIENT)
Dept: HEALTH INFORMATION MANAGEMENT | Facility: OTHER | Age: 63
End: 2023-11-21
Payer: COMMERCIAL

## 2024-01-09 ENCOUNTER — PATIENT MESSAGE (OUTPATIENT)
Dept: HEALTH INFORMATION MANAGEMENT | Facility: OTHER | Age: 64
End: 2024-01-09